# Patient Record
Sex: FEMALE | Race: WHITE | NOT HISPANIC OR LATINO
[De-identification: names, ages, dates, MRNs, and addresses within clinical notes are randomized per-mention and may not be internally consistent; named-entity substitution may affect disease eponyms.]

---

## 2022-03-16 PROBLEM — Z00.00 ENCOUNTER FOR PREVENTIVE HEALTH EXAMINATION: Status: ACTIVE | Noted: 2022-03-16

## 2022-03-23 ENCOUNTER — APPOINTMENT (OUTPATIENT)
Dept: NEUROLOGY | Facility: CLINIC | Age: 72
End: 2022-03-23

## 2022-04-04 ENCOUNTER — NON-APPOINTMENT (OUTPATIENT)
Age: 72
End: 2022-04-04

## 2022-04-07 ENCOUNTER — APPOINTMENT (OUTPATIENT)
Dept: NEUROLOGY | Facility: CLINIC | Age: 72
End: 2022-04-07
Payer: MEDICARE

## 2022-04-07 PROCEDURE — 93886 INTRACRANIAL COMPLETE STUDY: CPT

## 2022-04-07 PROCEDURE — 93880 EXTRACRANIAL BILAT STUDY: CPT

## 2022-04-07 PROCEDURE — 93892 TCD EMBOLI DETECT W/O INJ: CPT

## 2022-04-11 ENCOUNTER — APPOINTMENT (OUTPATIENT)
Dept: NEUROLOGY | Facility: CLINIC | Age: 72
End: 2022-04-11
Payer: MEDICARE

## 2022-04-11 VITALS
SYSTOLIC BLOOD PRESSURE: 146 MMHG | WEIGHT: 202 LBS | BODY MASS INDEX: 38.14 KG/M2 | HEIGHT: 61 IN | HEART RATE: 76 BPM | DIASTOLIC BLOOD PRESSURE: 82 MMHG

## 2022-04-11 DIAGNOSIS — Z82.49 FAMILY HISTORY OF ISCHEMIC HEART DISEASE AND OTHER DISEASES OF THE CIRCULATORY SYSTEM: ICD-10-CM
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
## 2022-04-11 DIAGNOSIS — Z83.3 FAMILY HISTORY OF DIABETES MELLITUS: ICD-10-CM

## 2022-04-11 DIAGNOSIS — Z78.9 OTHER SPECIFIED HEALTH STATUS: ICD-10-CM

## 2022-04-11 PROCEDURE — 99205 OFFICE O/P NEW HI 60 MIN: CPT

## 2022-04-11 RX ORDER — ASPIRIN 81 MG/1
81 TABLET, COATED ORAL DAILY
Refills: 0 | Status: ACTIVE | COMMUNITY

## 2022-04-11 NOTE — DISCUSSION/SUMMARY
[FreeTextEntry1] : Impression: Multiple chronic silent infarcts and a fall with possible loss of consciousness in March 2022. Possible etiologies of loss of consciousness could be acute stroke, seizure, or cardiac arrhythmia. \par \par Her visual fields are impaired and her gait is slowed, but she is otherwise neurologically intact. I have recommended she have an MRI/MRA to assess for recent infarction and stenoses. \par \par I have requested an ambulatory EEG to assess for epileptiform activity. \par \par I have provided her with a referral to cardiology to assess for cardiac origin of syncopal event.\par \par Due to impaired visual fields and occipital infarcts, I have recommended she have evaluation with neuro-ophthalmology. Referral provided. \par \par She endorsed snoring at night, raising concern for obstructive sleep apnea. I have requested a home sleep study. \par \par Carotid an transcranial dopplers on 4/7/22 were unremarkable.  I will defer to Dr. Fajardo as to whether the thyroid nodules incidentally detected on carotid doppler, warrant further workup and management\par \par She can follow up in 1-2 months (following above workup). I hope she remains free of serious trouble.

## 2022-04-11 NOTE — REVIEW OF SYSTEMS
[As Noted in HPI] : as noted in HPI [Negative] : Heme/Lymph [Fever] : no fever [Chills] : no chills [FreeTextEntry2] : adalid [de-identified] : remote hx of panic attacks

## 2022-04-11 NOTE — PHYSICAL EXAM
[General Appearance - Alert] : alert [General Appearance - In No Acute Distress] : in no acute distress [Oriented To Time, Place, And Person] : oriented to person, place, and time [Impaired Insight] : insight and judgment were intact [Sclera] : the sclera and conjunctiva were normal [PERRL With Normal Accommodation] : pupils were equal in size, round, reactive to light, with normal accommodation [Extraocular Movements] : extraocular movements were intact [Outer Ear] : the ears and nose were normal in appearance [Neck Appearance] : the appearance of the neck was normal [Neck Cervical Mass (___cm)] : no neck mass was observed [Jugular Venous Distention Increased] : there was no jugular-venous distention [Exaggerated Use Of Accessory Muscles For Inspiration] : no accessory muscle use [Heart Sounds] : normal S1 and S2 [No CVA Tenderness] : no ~M costovertebral angle tenderness [No Spinal Tenderness] : no spinal tenderness [Nail Clubbing] : no clubbing  or cyanosis of the fingernails [Motor Tone] : muscle strength and tone were normal [Skin Color & Pigmentation] : normal skin color and pigmentation [] : no rash [FreeTextEntry1] : Neurologic examination:\par \par Mental status:\par \par The patient is alert, attentive, and oriented. Speech is clear and fluent with good comprehension. Speech is slowed.\par \par Cranial nerves:\par \par CN II: Visual fields are were impaired, impaired on right lower quadrant. Pupils are 4 mm and briskly reactive to light. bilaterally.\par \par CN III, IV, VI: At primary gaze, there is no eye deviation.EOMI. No ptosis\par \par CN V: Facial sensation is intact bilaterally.\par \par CN VII: Face is symmetric with normal eye closure and smile.\par \par CN VII: Hearing is grossly intact.\par \par CN IX, X: Palate elevates symmetrically. Phonation is normal.\par \par CN XI: Head turning and shoulder shrug are intact\par \par CN XII: Tongue is midline with normal movements and no atrophy.\par \par Motor:\par \par There is no pronator drift of out-stretched arms. Muscle bulk and tone are normal. Strength is full bilaterally. Fine finger movements are intact.\par \par Sensory:\par \par Light touch intact in fingers and toes. No extinction.\par \par Coordination:\par \par  There is no dysmetria on finger-to-nose. Romberg is absent.\par \par Gait/Stance:\par \par Wide base and slowed. Unable to walk in tandem. Romberg is absent.\par

## 2022-04-11 NOTE — HISTORY OF PRESENT ILLNESS
[FreeTextEntry1] : Jyotsna Pathak is a 71 year old left handed lady with a past medical history of multiple chronic cerebral infarcts. She presents today with her daughter and reports that on 3/12/22, she fell at home. She did not experience any prior dizziness or presyncope.Her daughter reports that when she went in to the room, she found her mother on the floor, speaking normally. The patient does not recall the fall and only remembers being in the hospital. She was hospitalized at [facility] in [place] and they reportedly "ruled out" acute stroke and thought the most likely cause of the event was a seizure. She denied any associated focal neurologic deficits.\par \par Carotid doppler 22: No evidence of significant arterial occlusive disease in the internal carotid arteries. No significant stenosis in the external carotid arteries bilaterally. Incidental finding: nodules imaged in the right lobe of the thyroid gland measurin) 1.6 x 1 cm 2) 0.6 x 0.4 cm\par TCD 22: The [place] of [place] is within normal limits\par \par CT head 3/12/22 (per report only): Old infarcts in the right occipital lobe, right frontal and parietal operculum, and left parietal lobe. No acute large vessel infarction or intracranial hemorrhage.\par \par PCP Dr. Jesus Fajardo

## 2022-04-18 ENCOUNTER — TRANSCRIPTION ENCOUNTER (OUTPATIENT)
Age: 72
End: 2022-04-18

## 2022-04-25 ENCOUNTER — NON-APPOINTMENT (OUTPATIENT)
Age: 72
End: 2022-04-25

## 2022-04-26 ENCOUNTER — NON-APPOINTMENT (OUTPATIENT)
Age: 72
End: 2022-04-26

## 2022-04-26 ENCOUNTER — APPOINTMENT (OUTPATIENT)
Dept: OPHTHALMOLOGY | Facility: CLINIC | Age: 72
End: 2022-04-26
Payer: MEDICARE

## 2022-04-26 PROCEDURE — 99204 OFFICE O/P NEW MOD 45 MIN: CPT

## 2022-04-26 PROCEDURE — 92133 CPTRZD OPH DX IMG PST SGM ON: CPT

## 2022-04-26 PROCEDURE — 92083 EXTENDED VISUAL FIELD XM: CPT

## 2022-05-01 ENCOUNTER — OUTPATIENT (OUTPATIENT)
Dept: OUTPATIENT SERVICES | Facility: HOSPITAL | Age: 72
LOS: 1 days | End: 2022-05-01
Payer: MEDICARE

## 2022-05-01 ENCOUNTER — APPOINTMENT (OUTPATIENT)
Dept: MRI IMAGING | Facility: CLINIC | Age: 72
End: 2022-05-01
Payer: MEDICARE
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
## 2022-05-01 PROCEDURE — G1004: CPT

## 2022-05-01 PROCEDURE — 70549 MR ANGIOGRAPH NECK W/O&W/DYE: CPT | Mod: 26,ME

## 2022-05-01 PROCEDURE — 70551 MRI BRAIN STEM W/O DYE: CPT | Mod: ME

## 2022-05-01 PROCEDURE — 70544 MR ANGIOGRAPHY HEAD W/O DYE: CPT | Mod: 26,ME,59

## 2022-05-01 PROCEDURE — A9585: CPT

## 2022-05-01 PROCEDURE — 70551 MRI BRAIN STEM W/O DYE: CPT | Mod: 26,ME

## 2022-05-01 PROCEDURE — 70544 MR ANGIOGRAPHY HEAD W/O DYE: CPT | Mod: ME

## 2022-05-01 PROCEDURE — 70549 MR ANGIOGRAPH NECK W/O&W/DYE: CPT | Mod: ME

## 2022-05-12 ENCOUNTER — APPOINTMENT (OUTPATIENT)
Dept: NEUROLOGY | Facility: CLINIC | Age: 72
End: 2022-05-12
Payer: MEDICARE

## 2022-05-12 VITALS
WEIGHT: 202 LBS | HEART RATE: 74 BPM | DIASTOLIC BLOOD PRESSURE: 82 MMHG | BODY MASS INDEX: 38.14 KG/M2 | RESPIRATION RATE: 15 BRPM | SYSTOLIC BLOOD PRESSURE: 138 MMHG | HEIGHT: 61 IN

## 2022-05-12 DIAGNOSIS — Z86.73 PERSONAL HISTORY OF TRANSIENT ISCHEMIC ATTACK (TIA), AND CEREBRAL INFARCTION W/OUT RESIDUAL DEFICITS: ICD-10-CM

## 2022-05-12 DIAGNOSIS — E78.5 HYPERLIPIDEMIA, UNSPECIFIED: ICD-10-CM

## 2022-05-12 PROCEDURE — 99214 OFFICE O/P EST MOD 30 MIN: CPT

## 2022-05-12 RX ORDER — ATORVASTATIN CALCIUM 40 MG/1
40 TABLET, FILM COATED ORAL
Qty: 30 | Refills: 3 | Status: ACTIVE | COMMUNITY
Start: 2022-05-12 | End: 1900-01-01

## 2022-05-12 NOTE — DISCUSSION/SUMMARY
[FreeTextEntry1] : Impression: Multiple chronic silent infarcts and a fall with possible loss of consciousness in March 2022. Possible etiologies of loss of consciousness could be acute stroke, seizure, or cardiac arrhythmia. \par \par Her visual fields are impaired and her gait is slowed, but she is otherwise neurologically intact. I have recommended she have an MRI/MRA to assess for recent infarction and stenoses. \par \par I have requested an ambulatory EEG to assess for epileptiform activity. \par \par I have provided her with a referral to cardiology to assess for cardiac origin of syncopal event.\par \par Due to impaired visual fields and occipital infarcts, I have recommended she have evaluation with neuro-ophthalmology. Referral provided. \par \par She endorsed snoring at night, raising concern for obstructive sleep apnea. I have requested a home sleep study. \par \par Carotid an transcranial dopplers on 4/7/22 were unremarkable.  I will defer to Dr. Fajardo as to whether the thyroid nodules incidentally detected on carotid doppler, warrant further workup and management\par \par 5/12/22\par Overall she is neurologically stable. MRI/MRA from 5/1/22 revealed no acute intracranial hemorrhage or acute infarct. She has multifocal stenosis bilateral mid and distal posterior cerebral arteries with attenuation of distal right posterior cerebral artery.\par \par To assess for origin of possible syncopal event, she is scheduled to see cardiology on 5/12/22 and ambulatory EEG on 6/7/22.\par \par She should continue to benefit from aggressive vascular risk factor control.\par \par She again endorsed snoring at night, raising concern for obstructive sleep apnea. I have again requested a home sleep study. \par \par She can follow up on a yearly basis with carotid and transcranial dopplers, or sooner as needed. I hope she remains free of serious trouble.

## 2022-05-12 NOTE — HISTORY OF PRESENT ILLNESS
[FreeTextEntry1] : Jyotsna Pathak is a 71 year old left handed lady with a past medical history of multiple chronic cerebral infarcts. She presents today with her daughter and reports that on 3/12/22, she fell at home. She did not experience any prior dizziness or presyncope.Her daughter reports that when she went in to the room, she found her mother on the floor, speaking normally. The patient does not recall the fall and only remembers being in the hospital. She was hospitalized at [facility] in [place] and they reportedly "ruled out" acute stroke and thought the most likely cause of the event was a seizure. She denied any associated focal neurologic deficits.\par \par Carotid doppler 22: No evidence of significant arterial occlusive disease in the internal carotid arteries. No significant stenosis in the external carotid arteries bilaterally. Incidental finding: nodules imaged in the right lobe of the thyroid gland measurin) 1.6 x 1 cm 2) 0.6 x 0.4 cm\par TCD 22: The [place] of [place] is within normal limits\par \par CT head 3/12/22 (per report only): Old infarcts in the right occipital lobe, right frontal and parietal operculum, and left parietal lobe. No acute large vessel infarction or intracranial hemorrhage.\par \par 22\par She presents today with her daughter. She reports feeling well and denies any interval stroke or TIA symptoms. \par \par MRI/MRA 22:\par No acute intracranial hemorrhage or acute infarct.\par Multifocal stenosis bilateral mid and distal posterior cerebral arteries with attenuation of distal right posterior cerebral artery.\par Narrowing at origin of right vertebral artery.\par \par \par PCP Dr. Jesus Fajardo

## 2022-05-12 NOTE — REVIEW OF SYSTEMS
[As Noted in HPI] : as noted in HPI [Negative] : Heme/Lymph [Fever] : no fever [Chills] : no chills [FreeTextEntry2] : adalid [de-identified] : remote hx of panic attacks

## 2022-06-07 ENCOUNTER — APPOINTMENT (OUTPATIENT)
Dept: NEUROLOGY | Facility: CLINIC | Age: 72
End: 2022-06-07
Payer: MEDICARE

## 2022-06-07 PROCEDURE — 95816 EEG AWAKE AND DROWSY: CPT

## 2022-06-08 PROCEDURE — 95705 EEG W/O VID 2-12 HR UNMNTR: CPT

## 2022-06-08 PROCEDURE — 95717 EEG PHYS/QHP 2-12 HR W/O VID: CPT

## 2022-06-09 PROCEDURE — 95708 EEG WO VID EA 12-26HR UNMNTR: CPT

## 2022-06-09 PROCEDURE — 95700 EEG CONT REC W/VID EEG TECH: CPT

## 2022-06-09 PROCEDURE — 95719 EEG PHYS/QHP EA INCR W/O VID: CPT

## 2022-11-25 ENCOUNTER — RX RENEWAL (OUTPATIENT)
Age: 72
End: 2022-11-25

## 2022-12-21 ENCOUNTER — RX RENEWAL (OUTPATIENT)
Age: 72
End: 2022-12-21

## 2023-01-23 ENCOUNTER — RX RENEWAL (OUTPATIENT)
Age: 73
End: 2023-01-23

## 2023-02-19 ENCOUNTER — RX RENEWAL (OUTPATIENT)
Age: 73
End: 2023-02-19

## 2023-03-16 ENCOUNTER — RX RENEWAL (OUTPATIENT)
Age: 73
End: 2023-03-16

## 2023-04-13 ENCOUNTER — RX RENEWAL (OUTPATIENT)
Age: 73
End: 2023-04-13

## 2023-05-12 ENCOUNTER — APPOINTMENT (OUTPATIENT)
Dept: NEUROLOGY | Facility: CLINIC | Age: 73
End: 2023-05-12

## 2023-05-15 ENCOUNTER — RX RENEWAL (OUTPATIENT)
Age: 73
End: 2023-05-15

## 2023-06-09 ENCOUNTER — RX RENEWAL (OUTPATIENT)
Age: 73
End: 2023-06-09

## 2023-06-22 ENCOUNTER — APPOINTMENT (OUTPATIENT)
Dept: NEUROLOGY | Facility: CLINIC | Age: 73
End: 2023-06-22
Payer: MEDICARE

## 2023-06-22 PROCEDURE — 93880 EXTRACRANIAL BILAT STUDY: CPT

## 2023-06-22 PROCEDURE — 93886 INTRACRANIAL COMPLETE STUDY: CPT

## 2023-06-22 PROCEDURE — 93892 TCD EMBOLI DETECT W/O INJ: CPT

## 2023-06-23 ENCOUNTER — APPOINTMENT (OUTPATIENT)
Dept: NEUROLOGY | Facility: CLINIC | Age: 73
End: 2023-06-23
Payer: MEDICARE

## 2023-06-23 VITALS — SYSTOLIC BLOOD PRESSURE: 135 MMHG | DIASTOLIC BLOOD PRESSURE: 78 MMHG | HEART RATE: 77 BPM

## 2023-06-23 VITALS — WEIGHT: 202 LBS | HEIGHT: 61 IN | BODY MASS INDEX: 38.14 KG/M2

## 2023-06-23 PROCEDURE — 99214 OFFICE O/P EST MOD 30 MIN: CPT

## 2023-06-23 NOTE — HISTORY OF PRESENT ILLNESS
[FreeTextEntry1] : She is a 72 year old left handed lady.\par \par 22\par She has a past medical history of multiple chronic cerebral infarcts. She presents today with her daughter and reports that on 3/12/22, she fell at home. She did not experience any prior dizziness or presyncope.Her daughter reports that when she went in to the room, she found her mother on the floor, speaking normally. The patient does not recall the fall and only remembers being in the hospital. She was hospitalized at [facility] in [place] and they reportedly "ruled out" acute stroke and thought the most likely cause of the event was a seizure. She denied any associated focal neurologic deficits.\par \par Carotid Doppler 22: No evidence of significant arterial occlusive disease in the internal carotid arteries. No significant stenosis in the external carotid arteries bilaterally. Incidental finding: nodules imaged in the right lobe of the thyroid gland measurin) 1.6 x 1 cm 2) 0.6 x 0.4 cm\par TCD 22: The [place] of [place] is within normal limits\par \par CT head 3/12/22 (per report only): Old infarcts in the right occipital lobe, right frontal and parietal operculum, and left parietal lobe. No acute large vessel infarction or intracranial hemorrhage.\par \par 22\par She presents today with her daughter. She reports feeling well and denies any interval stroke or TIA symptoms. \par \par MRI/MRA 22:\par No acute intracranial hemorrhage or acute infarct.\par Multifocal stenosis bilateral mid and distal posterior cerebral arteries with attenuation of distal right posterior cerebral artery.\par Narrowing at origin of right vertebral artery.\par \par 23\par She presents today with her daughter. Her  passed away 6 months ago.  Over the past 6 months, her daughter notes that her mother has been less interested in helping around the house and that her reaction time has been slower.\par \par Carotid Doppler 23: Mild <40% right ICA stenosis. Left ICA is tortuous but within normal limits. No significant stenosis in the external carotid arteries bilaterally. Incidental finding: nodules imaged in the right lobe of the thyroid gland measurin) 1.8 x 1.1 cm 2) 0.6 x 0.4 cm\par TCD 23: The [place] of [place] is within normal limits\par \par PCP Dr. Jesus Ponce

## 2023-06-23 NOTE — REVIEW OF SYSTEMS
[As Noted in HPI] : as noted in HPI [Negative] : Heme/Lymph [Fever] : no fever [Chills] : no chills [FreeTextEntry2] : adalid [de-identified] : remote hx of panic attacks

## 2023-06-23 NOTE — DISCUSSION/SUMMARY
[FreeTextEntry1] : Impression: Multiple chronic silent infarcts and a fall with possible loss of consciousness in March 2022. Possible etiologies of loss of consciousness could be acute stroke, seizure, or cardiac arrhythmia. \par \par Her visual fields are impaired and her gait is slowed, but she is otherwise neurologically intact. I have recommended she have an MRI/MRA to assess for recent infarction and stenoses. \par \par I have requested an ambulatory EEG to assess for epileptiform activity. \par \par I have provided her with a referral to cardiology to assess for cardiac origin of syncopal event.\par \par Due to impaired visual fields and occipital infarcts, I have recommended she have evaluation with neuro-ophthalmology. Referral provided. \par \par She endorsed snoring at night, raising concern for obstructive sleep apnea. I have requested a home sleep study. \par \par Carotid an transcranial dopplers on 4/7/22 were unremarkable.  I will defer to Dr. Fajardo as to whether the thyroid nodules incidentally detected on carotid doppler, warrant further workup and management\par \par 5/12/22\par Overall she is neurologically stable. MRI/MRA from 5/1/22 revealed no acute intracranial hemorrhage or acute infarct. She has multifocal stenosis bilateral mid and distal posterior cerebral arteries with attenuation of distal right posterior cerebral artery.\par \par To assess for origin of possible syncopal event, she is scheduled to see cardiology on 5/12/22 and ambulatory EEG on 6/7/22.\par \par She should continue to benefit from aggressive vascular risk factor control.\par \par She again endorsed snoring at night, raising concern for obstructive sleep apnea. I have again requested a home sleep study. \par \par \par 6/23/23\par - Overall she is neurologically stable.\par - Her daughter noticed that over the last 6 months, following the passing of the patient's , she has been less interested in doing household chores and has had a delayed reaction time. I suspect that this is due to depression and have recommended she see a mental health provider. I have also requested a repeat MRI brain to screen for any new cerebral pathology.\par - Her EEG from last year did not demonstrate seizure activity but did reveal right temporal sharp waves. I have recommended she consult with one of our epilepsy providers for an opinion regarding if she should be taking an AED.\par - Dopplers done today were unremarkable.\par \par She can follow up in 2 months, after her MRI. I hope she remains free of serious trouble.

## 2023-06-23 NOTE — PHYSICAL EXAM
[General Appearance - Alert] : alert [General Appearance - In No Acute Distress] : in no acute distress [Oriented To Time, Place, And Person] : oriented to person, place, and time [Impaired Insight] : insight and judgment were intact [Sclera] : the sclera and conjunctiva were normal [PERRL With Normal Accommodation] : pupils were equal in size, round, reactive to light, with normal accommodation [Extraocular Movements] : extraocular movements were intact [Outer Ear] : the ears and nose were normal in appearance [Neck Appearance] : the appearance of the neck was normal [Neck Cervical Mass (___cm)] : no neck mass was observed [Jugular Venous Distention Increased] : there was no jugular-venous distention [Exaggerated Use Of Accessory Muscles For Inspiration] : no accessory muscle use [Heart Sounds] : normal S1 and S2 [No CVA Tenderness] : no ~M costovertebral angle tenderness [No Spinal Tenderness] : no spinal tenderness [Nail Clubbing] : no clubbing  or cyanosis of the fingernails [Motor Tone] : muscle strength and tone were normal [Skin Color & Pigmentation] : normal skin color and pigmentation [] : no rash [FreeTextEntry1] : Neurologic examination:\par \par Mental status:\par \par The patient is alert, attentive, and oriented. Speech is clear and fluent with good comprehension. Speech is slowed.\par \par Cranial nerves:\par \par CN II: Visual fields are were impaired, impaired on right lower quadrant. \par \par CN III, IV, VI: At primary gaze, there is no eye deviation.EOMI. No ptosis\par \par CN V: Facial sensation is intact bilaterally.\par \par CN VII: Face is symmetric with normal eye closure and smile.\par \par CN VII: Hearing is grossly intact.\par \par CN IX, X: Palate elevates symmetrically. Phonation is normal.\par \par CN XI: Head turning and shoulder shrug are intact\par \par CN XII: Tongue is midline with normal movements and no atrophy.\par \par Motor:\par \par There is no pronator drift of out-stretched arms. Muscle bulk and tone are normal. Strength is full bilaterally. Fine finger movements are intact.\par \par Sensory:\par \par Light touch intact in fingers and toes. No extinction.\par \par Coordination:\par \par  There is no dysmetria on finger-to-nose. Romberg is absent.\par \par Gait/Stance:\par \par Wide base and slowed. Unable to walk in tandem. Romberg is absent.\par

## 2023-07-06 ENCOUNTER — RX RENEWAL (OUTPATIENT)
Age: 73
End: 2023-07-06

## 2023-07-30 ENCOUNTER — RX RENEWAL (OUTPATIENT)
Age: 73
End: 2023-07-30

## 2023-08-01 ENCOUNTER — APPOINTMENT (OUTPATIENT)
Dept: NEUROLOGY | Facility: CLINIC | Age: 73
End: 2023-08-01

## 2023-08-03 ENCOUNTER — OUTPATIENT (OUTPATIENT)
Dept: OUTPATIENT SERVICES | Facility: HOSPITAL | Age: 73
LOS: 1 days | End: 2023-08-03
Payer: MEDICARE

## 2023-08-03 ENCOUNTER — APPOINTMENT (OUTPATIENT)
Dept: MRI IMAGING | Facility: CLINIC | Age: 73
End: 2023-08-03
Payer: MEDICARE

## 2023-08-03 DIAGNOSIS — I63.9 CEREBRAL INFARCTION, UNSPECIFIED: ICD-10-CM

## 2023-08-03 PROCEDURE — 70551 MRI BRAIN STEM W/O DYE: CPT

## 2023-08-03 PROCEDURE — 70551 MRI BRAIN STEM W/O DYE: CPT | Mod: 26,MH

## 2023-08-04 ENCOUNTER — NON-APPOINTMENT (OUTPATIENT)
Age: 73
End: 2023-08-04

## 2023-08-18 ENCOUNTER — RX RENEWAL (OUTPATIENT)
Age: 73
End: 2023-08-18

## 2023-09-05 ENCOUNTER — APPOINTMENT (OUTPATIENT)
Dept: NEUROLOGY | Facility: CLINIC | Age: 73
End: 2023-09-05
Payer: MEDICARE

## 2023-09-05 VITALS
HEART RATE: 81 BPM | WEIGHT: 206 LBS | HEIGHT: 61 IN | BODY MASS INDEX: 38.89 KG/M2 | DIASTOLIC BLOOD PRESSURE: 80 MMHG | SYSTOLIC BLOOD PRESSURE: 143 MMHG

## 2023-09-05 PROCEDURE — 99214 OFFICE O/P EST MOD 30 MIN: CPT

## 2023-09-06 NOTE — HISTORY OF PRESENT ILLNESS
[FreeTextEntry1] : *** 2023 ***  MONICA PIMENTEL is here for initial visit for probable seizure. her extended vascular history is as below: Her EEG showed RT sharps. no other clinical seizues since last year. possible some memory decline.   She is a 72 year old left handed lady.  22 She has a past medical history of multiple chronic cerebral infarcts. She presents today with her daughter and reports that on 3/12/22, she fell at home. She did not experience any prior dizziness or presyncope.Her daughter reports that when she went in to the room, she found her mother on the floor, speaking normally. The patient does not recall the fall and only remembers being in the hospital. She was hospitalized at [facility] in [place] and they reportedly "ruled out" acute stroke and thought the most likely cause of the event was a seizure. She denied any associated focal neurologic deficits.  Carotid Doppler 22: No evidence of significant arterial occlusive disease in the internal carotid arteries. No significant stenosis in the external carotid arteries bilaterally. Incidental finding: nodules imaged in the right lobe of the thyroid gland measurin) 1.6 x 1 cm 2) 0.6 x 0.4 cm TCD 22: The [place] of [place] is within normal limits  CT head 3/12/22 (per report only): Old infarcts in the right occipital lobe, right frontal and parietal operculum, and left parietal lobe. No acute large vessel infarction or intracranial hemorrhage.  22 She presents today with her daughter. She reports feeling well and denies any interval stroke or TIA symptoms.   MRI/MRA 22: No acute intracranial hemorrhage or acute infarct. Multifocal stenosis bilateral mid and distal posterior cerebral arteries with attenuation of distal right posterior cerebral artery. Narrowing at origin of right vertebral artery.  23 She presents today with her daughter. Her  passed away 6 months ago.  Over the past 6 months, her daughter notes that her mother has been less interested in helping around the house and that her reaction time has been slower.  Carotid Doppler 23: Mild <40% right ICA stenosis. Left ICA is tortuous but within normal limits. No significant stenosis in the external carotid arteries bilaterally. Incidental finding: nodules imaged in the right lobe of the thyroid gland measurin) 1.8 x 1.1 cm 2) 0.6 x 0.4 cm TCD 23: The [place] of [place] is within normal limits  PCP Dr. Jesus Ponce

## 2023-09-06 NOTE — ASSESSMENT
[FreeTextEntry1] : MONICA PIMENTEL is a 73 yo F  with one clinical convulsive seizure and additional risk for seizures. She will qualify for AEDs. Plan: start xcopri 12.5-25 with a target of 100 daily. Naizylam rescue f/u in 4 mo

## 2023-09-06 NOTE — REASON FOR VISIT
[Initial Eval - Existing Diagnosis] : an initial evaluation of an existing diagnosis [Follow-Up: _____] : a [unfilled] follow-up visit [Family Member] : family member

## 2023-09-06 NOTE — PHYSICAL EXAM
[FreeTextEntry1] : Neurologic examination:\par  \par  Mental status:\par  \par  The patient is alert, attentive, and oriented. Speech is clear and fluent with good comprehension. Speech is slowed.\par  \par  Cranial nerves:\par  \par  CN II: Visual fields are were impaired, impaired on right lower quadrant. \par  \par  CN III, IV, VI: At primary gaze, there is no eye deviation.EOMI. No ptosis\par  \par  CN V: Facial sensation is intact bilaterally.\par  \par  CN VII: Face is symmetric with normal eye closure and smile.\par  \par  CN VII: Hearing is grossly intact.\par  \par  CN IX, X: Palate elevates symmetrically. Phonation is normal.\par  \par  CN XI: Head turning and shoulder shrug are intact\par  \par  CN XII: Tongue is midline with normal movements and no atrophy.\par  \par  Motor:\par  \par  There is no pronator drift of out-stretched arms. Muscle bulk and tone are normal. Strength is full bilaterally. Fine finger movements are intact.\par  \par  Sensory:\par  \par  Light touch intact in fingers and toes. No extinction.\par  \par  Coordination:\par  \par   There is no dysmetria on finger-to-nose. Romberg is absent.\par  \par  Gait/Stance:\par  \par  Wide base and slowed. Unable to walk in tandem. Romberg is absent.\par   [General Appearance - Alert] : alert [General Appearance - In No Acute Distress] : in no acute distress [Oriented To Time, Place, And Person] : oriented to person, place, and time [Impaired Insight] : insight and judgment were intact [Sclera] : the sclera and conjunctiva were normal [PERRL With Normal Accommodation] : pupils were equal in size, round, reactive to light, with normal accommodation [Extraocular Movements] : extraocular movements were intact [Outer Ear] : the ears and nose were normal in appearance [Neck Appearance] : the appearance of the neck was normal [Neck Cervical Mass (___cm)] : no neck mass was observed [Jugular Venous Distention Increased] : there was no jugular-venous distention [Exaggerated Use Of Accessory Muscles For Inspiration] : no accessory muscle use [Heart Sounds] : normal S1 and S2 [No CVA Tenderness] : no ~M costovertebral angle tenderness [No Spinal Tenderness] : no spinal tenderness [Nail Clubbing] : no clubbing  or cyanosis of the fingernails [Motor Tone] : muscle strength and tone were normal [Skin Color & Pigmentation] : normal skin color and pigmentation [] : no rash

## 2023-09-06 NOTE — REVIEW OF SYSTEMS
[Fever] : no fever [Chills] : no chills [As Noted in HPI] : as noted in HPI [Negative] : Heme/Lymph [FreeTextEntry2] : adalid [de-identified] : remote hx of panic attacks

## 2023-09-14 ENCOUNTER — RX RENEWAL (OUTPATIENT)
Age: 73
End: 2023-09-14

## 2023-09-14 ENCOUNTER — NON-APPOINTMENT (OUTPATIENT)
Age: 73
End: 2023-09-14

## 2023-09-28 ENCOUNTER — APPOINTMENT (OUTPATIENT)
Dept: NEUROLOGY | Facility: CLINIC | Age: 73
End: 2023-09-28

## 2023-10-24 ENCOUNTER — APPOINTMENT (OUTPATIENT)
Dept: NEUROLOGY | Facility: CLINIC | Age: 73
End: 2023-10-24

## 2023-10-24 RX ORDER — CENOBAMATE 50MG-100MG
14 X 50 MG & KIT ORAL
Qty: 1 | Refills: 0 | Status: COMPLETED | COMMUNITY
Start: 2023-09-05 | End: 2023-10-24

## 2023-10-24 RX ORDER — MIDAZOLAM 5 MG/.1ML
5 SPRAY NASAL
Qty: 1 | Refills: 0 | Status: ACTIVE | COMMUNITY
Start: 2023-09-05 | End: 1900-01-01

## 2023-10-26 ENCOUNTER — RX RENEWAL (OUTPATIENT)
Age: 73
End: 2023-10-26

## 2023-11-14 ENCOUNTER — APPOINTMENT (OUTPATIENT)
Dept: NEUROLOGY | Facility: CLINIC | Age: 73
End: 2023-11-14

## 2023-11-23 ENCOUNTER — RX RENEWAL (OUTPATIENT)
Age: 73
End: 2023-11-23

## 2023-12-05 ENCOUNTER — APPOINTMENT (OUTPATIENT)
Dept: NEUROLOGY | Facility: CLINIC | Age: 73
End: 2023-12-05

## 2023-12-08 ENCOUNTER — APPOINTMENT (OUTPATIENT)
Dept: NEUROLOGY | Facility: CLINIC | Age: 73
End: 2023-12-08
Payer: MEDICARE

## 2023-12-08 VITALS
BODY MASS INDEX: 38.89 KG/M2 | SYSTOLIC BLOOD PRESSURE: 165 MMHG | DIASTOLIC BLOOD PRESSURE: 84 MMHG | WEIGHT: 206 LBS | HEART RATE: 66 BPM | HEIGHT: 61 IN

## 2023-12-08 PROCEDURE — 99214 OFFICE O/P EST MOD 30 MIN: CPT

## 2023-12-21 ENCOUNTER — RX RENEWAL (OUTPATIENT)
Age: 73
End: 2023-12-21

## 2024-01-05 ENCOUNTER — APPOINTMENT (OUTPATIENT)
Dept: NEUROLOGY | Facility: CLINIC | Age: 74
End: 2024-01-05

## 2024-01-29 ENCOUNTER — RX RENEWAL (OUTPATIENT)
Age: 74
End: 2024-01-29

## 2024-02-08 ENCOUNTER — APPOINTMENT (OUTPATIENT)
Dept: NEUROLOGY | Facility: CLINIC | Age: 74
End: 2024-02-08

## 2024-02-27 ENCOUNTER — APPOINTMENT (OUTPATIENT)
Dept: NEUROLOGY | Facility: CLINIC | Age: 74
End: 2024-02-27
Payer: MEDICARE

## 2024-02-27 VITALS — BODY MASS INDEX: 38.89 KG/M2 | HEIGHT: 61 IN | WEIGHT: 206 LBS

## 2024-02-27 PROCEDURE — 99214 OFFICE O/P EST MOD 30 MIN: CPT

## 2024-03-04 ENCOUNTER — RX RENEWAL (OUTPATIENT)
Age: 74
End: 2024-03-04

## 2024-03-04 NOTE — HISTORY OF PRESENT ILLNESS
[FreeTextEntry1] : *** 2024 ***  MONICA PIMENTEL is here for follow up. stable no seizures   *** 2023 ***  MONICA PIMENTEL is here for initial visit for probable seizure. her extended vascular history is as below: Her EEG showed RT sharps. no other clinical seizues since last year. possible some memory decline.   She is a 72 year old left handed lady.  22 She has a past medical history of multiple chronic cerebral infarcts. She presents today with her daughter and reports that on 3/12/22, she fell at home. She did not experience any prior dizziness or presyncope.Her daughter reports that when she went in to the room, she found her mother on the floor, speaking normally. The patient does not recall the fall and only remembers being in the hospital. She was hospitalized at [facility] in [place] and they reportedly "ruled out" acute stroke and thought the most likely cause of the event was a seizure. She denied any associated focal neurologic deficits.  Carotid Doppler 22: No evidence of significant arterial occlusive disease in the internal carotid arteries. No significant stenosis in the external carotid arteries bilaterally. Incidental finding: nodules imaged in the right lobe of the thyroid gland measurin) 1.6 x 1 cm 2) 0.6 x 0.4 cm TCD 22: The [place] is within normal limits  CT head 3/12/22 (per report only): Old infarcts in the right occipital lobe, right frontal and parietal operculum, and left parietal lobe. No acute large vessel infarction or intracranial hemorrhage.  22 She presents today with her daughter. She reports feeling well and denies any interval stroke or TIA symptoms.   MRI/MRA 22: No acute intracranial hemorrhage or acute infarct. Multifocal stenosis bilateral mid and distal posterior cerebral arteries with attenuation of distal right posterior cerebral artery. Narrowing at origin of right vertebral artery.  23 She presents today with her daughter. Her  passed away 6 months ago.  Over the past 6 months, her daughter notes that her mother has been less interested in helping around the house and that her reaction time has been slower.  Carotid Doppler 23: Mild <40% right ICA stenosis. Left ICA is tortuous but within normal limits. No significant stenosis in the external carotid arteries bilaterally. Incidental finding: nodules imaged in the right lobe of the thyroid gland measurin) 1.8 x 1.1 cm 2) 0.6 x 0.4 cm TCD 23: The [place] is within normal limits  PCP Dr. Jesus Ponce

## 2024-03-04 NOTE — ASSESSMENT
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  Plan: continue  xcopri 100 daily f/u in 3 months

## 2024-03-04 NOTE — REVIEW OF SYSTEMS
[As Noted in HPI] : as noted in HPI [Negative] : Heme/Lymph [Fever] : no fever [Chills] : no chills [de-identified] : remote hx of panic attacks [FreeTextEntry2] : adalid

## 2024-03-04 NOTE — REASON FOR VISIT
[Follow-Up: _____] : a [unfilled] follow-up visit [Family Member] : family member [Initial Eval - Existing Diagnosis] : an initial evaluation of an existing diagnosis

## 2024-04-03 ENCOUNTER — RESULT REVIEW (OUTPATIENT)
Age: 74
End: 2024-04-03

## 2024-04-03 ENCOUNTER — OUTPATIENT (OUTPATIENT)
Dept: OUTPATIENT SERVICES | Facility: HOSPITAL | Age: 74
LOS: 1 days | End: 2024-04-03
Payer: MEDICARE

## 2024-04-03 ENCOUNTER — APPOINTMENT (OUTPATIENT)
Dept: NUCLEAR MEDICINE | Facility: IMAGING CENTER | Age: 74
End: 2024-04-03
Payer: MEDICARE

## 2024-04-03 DIAGNOSIS — G40.109 LOCALIZATION-RELATED (FOCAL) (PARTIAL) SYMPTOMATIC EPILEPSY AND EPILEPTIC SYNDROMES WITH SIMPLE PARTIAL SEIZURES, NOT INTRACTABLE, WITHOUT STATUS EPILEPTICUS: ICD-10-CM

## 2024-04-03 PROCEDURE — 78608 BRAIN IMAGING (PET): CPT

## 2024-04-03 PROCEDURE — 78999 UNLISTED MISC PX DX NUC MED: CPT | Mod: 26,MH

## 2024-04-03 PROCEDURE — 78608 BRAIN IMAGING (PET): CPT | Mod: 26,MH

## 2024-04-03 PROCEDURE — 78999 UNLISTED MISC PX DX NUC MED: CPT

## 2024-04-03 PROCEDURE — A9552: CPT

## 2024-04-08 ENCOUNTER — TRANSCRIPTION ENCOUNTER (OUTPATIENT)
Age: 74
End: 2024-04-08

## 2024-04-20 ENCOUNTER — NON-APPOINTMENT (OUTPATIENT)
Age: 74
End: 2024-04-20

## 2024-05-01 RX ORDER — CENOBAMATE 100 MG/1
100 TABLET, FILM COATED ORAL
Qty: 30 | Refills: 5 | Status: ACTIVE | COMMUNITY
Start: 2023-10-24 | End: 1900-01-01

## 2024-05-03 ENCOUNTER — APPOINTMENT (OUTPATIENT)
Dept: NEUROLOGY | Facility: CLINIC | Age: 74
End: 2024-05-03

## 2024-05-27 ENCOUNTER — RX RENEWAL (OUTPATIENT)
Age: 74
End: 2024-05-27

## 2024-06-11 ENCOUNTER — APPOINTMENT (OUTPATIENT)
Dept: NEUROLOGY | Facility: CLINIC | Age: 74
End: 2024-06-11
Payer: MEDICARE

## 2024-06-11 ENCOUNTER — APPOINTMENT (OUTPATIENT)
Dept: NEUROLOGY | Facility: CLINIC | Age: 74
End: 2024-06-11

## 2024-06-11 VITALS
HEART RATE: 63 BPM | HEIGHT: 61 IN | DIASTOLIC BLOOD PRESSURE: 87 MMHG | WEIGHT: 200 LBS | SYSTOLIC BLOOD PRESSURE: 151 MMHG | BODY MASS INDEX: 37.76 KG/M2

## 2024-06-11 DIAGNOSIS — G40.109 LOCALIZATION-RELATED (FOCAL) (PARTIAL) SYMPTOMATIC EPILEPSY AND EPILEPTIC SYNDROMES WITH SIMPLE PARTIAL SEIZURES, NOT INTRACTABLE, W/OUT STATUS EPILEPTICUS: ICD-10-CM

## 2024-06-11 PROCEDURE — 99214 OFFICE O/P EST MOD 30 MIN: CPT

## 2024-06-11 PROCEDURE — 93886 INTRACRANIAL COMPLETE STUDY: CPT

## 2024-06-11 PROCEDURE — 93892 TCD EMBOLI DETECT W/O INJ: CPT

## 2024-06-11 PROCEDURE — 93880 EXTRACRANIAL BILAT STUDY: CPT

## 2024-06-17 ENCOUNTER — APPOINTMENT (OUTPATIENT)
Dept: NEUROLOGY | Facility: CLINIC | Age: 74
End: 2024-06-17
Payer: MEDICARE

## 2024-06-17 DIAGNOSIS — I63.9 CEREBRAL INFARCTION, UNSPECIFIED: ICD-10-CM

## 2024-06-17 PROBLEM — G40.109 TEMPORAL LOBE SEIZURE: Status: ACTIVE | Noted: 2023-09-05

## 2024-06-17 PROCEDURE — 99213 OFFICE O/P EST LOW 20 MIN: CPT

## 2024-06-17 PROCEDURE — G2211 COMPLEX E/M VISIT ADD ON: CPT

## 2024-06-17 RX ORDER — LACTOBACILLUS ACIDOPHILUS/PECT 30 MG-20MG
TABLET ORAL
Refills: 0 | Status: ACTIVE | COMMUNITY

## 2024-06-17 RX ORDER — ATORVASTATIN CALCIUM 40 MG/1
40 TABLET, FILM COATED ORAL
Qty: 90 | Refills: 2 | Status: ACTIVE | COMMUNITY
Start: 2022-11-25 | End: 1900-01-01

## 2024-06-17 RX ORDER — CALCIUM CARBONATE/VITAMIN D3 600 MG-10
TABLET ORAL
Refills: 0 | Status: ACTIVE | COMMUNITY

## 2024-06-17 NOTE — PHYSICAL EXAM
[General Appearance - Alert] : alert [General Appearance - In No Acute Distress] : in no acute distress [Oriented To Time, Place, And Person] : oriented to person, place, and time [Impaired Insight] : insight and judgment were intact [Sclera] : the sclera and conjunctiva were normal [PERRL With Normal Accommodation] : pupils were equal in size, round, reactive to light, with normal accommodation [Extraocular Movements] : extraocular movements were intact [Outer Ear] : the ears and nose were normal in appearance [Neck Appearance] : the appearance of the neck was normal [Neck Cervical Mass (___cm)] : no neck mass was observed [Jugular Venous Distention Increased] : there was no jugular-venous distention [Exaggerated Use Of Accessory Muscles For Inspiration] : no accessory muscle use [Heart Sounds] : normal S1 and S2 [No CVA Tenderness] : no ~M costovertebral angle tenderness [No Spinal Tenderness] : no spinal tenderness [Nail Clubbing] : no clubbing  or cyanosis of the fingernails [Motor Tone] : muscle strength and tone were normal [Skin Color & Pigmentation] : normal skin color and pigmentation [] : no rash [FreeTextEntry1] : This exam reflects the previous neurologic exam, in addition to all components of the exam which can be conducted by video, such as the NIH stroke scale, and other exam elements which do not require physical contact between provider and patient.  Neurologic examination:  Mental status:  The patient is alert, attentive, and oriented. Speech is clear and fluent with good comprehension. Speech is slowed.  Cranial nerves:  CN II: Visual fields are were impaired, impaired on right lower quadrant.   CN III, IV, VI: At primary gaze, there is no eye deviation.EOMI. No ptosis  CN V: Facial sensation is intact bilaterally.  CN VII: Face is symmetric with normal eye closure and smile.  CN VII: Hearing is grossly intact.  CN IX, X: Palate elevates symmetrically. Phonation is normal.  CN XI: Head turning and shoulder shrug are intact  CN XII: Tongue is midline with normal movements and no atrophy.  Motor:  There is no pronator drift of out-stretched arms. Muscle bulk and tone are normal. Strength is full bilaterally. Fine finger movements are intact.  Sensory:  Light touch intact in fingers and toes. No extinction.  Coordination:   There is no dysmetria on finger-to-nose. Romberg is absent.  Gait/Stance:  Wide base and slowed. Unable to walk in tandem. Romberg is absent.

## 2024-06-17 NOTE — HISTORY OF PRESENT ILLNESS
[Home] : at home, [unfilled] , at the time of the visit. [Medical Office: ([facility])___] : at the medical office located in  [Verbal consent obtained from patient] : the patient, [unfilled] [FreeTextEntry1] : She is a 73 year old left handed lady.  22 She has a past medical history of multiple chronic cerebral infarcts. She presents today with her daughter and reports that on 3/12/22, she fell at home. She did not experience any prior dizziness or presyncope.Her daughter reports that when she went in to the room, she found her mother on the floor, speaking normally. The patient does not recall the fall and only remembers being in the hospital. She was hospitalized at [facility] in [place] and they reportedly "ruled out" acute stroke and thought the most likely cause of the event was a seizure. She denied any associated focal neurologic deficits.  Carotid Doppler 22: No evidence of significant arterial occlusive disease in the internal carotid arteries. No significant stenosis in the external carotid arteries bilaterally. Incidental finding: nodules imaged in the right lobe of the thyroid gland measurin) 1.6 x 1 cm 2) 0.6 x 0.4 cm TCD 22: The [place] of [place] is within normal limits  CT head 3/12/22 (per report only): Old infarcts in the right occipital lobe, right frontal and parietal operculum, and left parietal lobe. No acute large vessel infarction or intracranial hemorrhage.  22 She presents today with her daughter. She reports feeling well and denies any interval stroke or TIA symptoms.   MRI/MRA 22: No acute intracranial hemorrhage or acute infarct. Multifocal stenosis bilateral mid and distal posterior cerebral arteries with attenuation of distal right posterior cerebral artery. Narrowing at origin of right vertebral artery.  23 She presents today with her daughter. Her  passed away 6 months ago.  Over the past 6 months, her daughter notes that her mother has been less interested in helping around the house and that her reaction time has been slower.  Carotid Doppler 23: Mild <40% right ICA stenosis. Left ICA is tortuous but within normal limits. No significant stenosis in the external carotid arteries bilaterally. Incidental finding: nodules imaged in the right lobe of the thyroid gland measurin) 1.8 x 1.1 cm 2) 0.6 x 0.4 cm TCD 23: The [place] of [place] is within normal limits  24 She presents today with her daughter. She denies any new focal neurologic symptoms.  Carotid Doppler 24:  Bilateral mild < 40% stenosis in the bulb of the carotid arteries.  artery.The ICAs are tortuous bilaterally INCIDENTAL FINDING: Nodules imaged in right lobe of thyroid gland. [place]: 1)1.9 x 1.4 cm. 2) 0.6 x 0.5 cm. RETROSPECTIVE COMPARISON: There has been a mild increase in the degree of stenosis in the bulb of the left carotid artery and an additional right thyroid nodule detected on the current exam as compared to the previous exam. . TCD 24: Normal Study right side of [place]. No sign of stenosis in the anterior and posterior circulations. Ophthalmic arteries and intracranial siphons are within normal limits. Vertebral arteries and basilar artery are within normal limits. Unable to insonate the left MCA,SOPHIE,PCA due to a poor temporal acoustic window. Highly technically difficult study. No significant change vs prior study  except unable to insonate left temporal window on the current exam.   PCP Dr. Jesus Ponce

## 2024-06-17 NOTE — REVIEW OF SYSTEMS
[Fever] : no fever [Chills] : no chills [As Noted in HPI] : as noted in HPI [Negative] : Heme/Lymph [FreeTextEntry2] : adalid [de-identified] : remote hx of panic attacks

## 2024-06-17 NOTE — REVIEW OF SYSTEMS
[As Noted in HPI] : as noted in HPI [Negative] : Heme/Lymph [Fever] : no fever [Chills] : no chills [FreeTextEntry2] : adalid [de-identified] : remote hx of panic attacks

## 2024-06-17 NOTE — DISCUSSION/SUMMARY
[FreeTextEntry1] : 4/11/22 Impression: Multiple chronic silent infarcts and a fall with possible loss of consciousness in March 2022. Possible etiologies of loss of consciousness could be acute stroke, seizure, or cardiac arrhythmia.  - Her visual fields are impaired and her gait is slowed, but she is otherwise neurologically intact. I have recommended she have an MRI/MRA to assess for recent infarction and stenoses.  - I have requested an ambulatory EEG to assess for epileptiform activity.  - I have provided her with a referral to cardiology to assess for cardiac origin of syncopal event. - Due to impaired visual fields and occipital infarcts, I have recommended she have evaluation with neuro-ophthalmology. Referral provided.  - She endorsed snoring at night, raising concern for obstructive sleep apnea. I have requested a home sleep study.  - Carotid an transcranial dopplers on 4/7/22 were unremarkable.  I will defer to Dr. Fajardo as to whether the thyroid nodules incidentally detected on carotid doppler, warrant further workup and management  5/12/22 - Overall she is neurologically stable. MRI/MRA from 5/1/22 revealed no acute intracranial hemorrhage or acute infarct. She has multifocal stenosis bilateral mid and distal posterior cerebral arteries with attenuation of distal right posterior cerebral artery. - To assess for origin of possible syncopal event, she is scheduled to see cardiology on 5/12/22 and ambulatory EEG on 6/7/22. - She should continue to benefit from aggressive vascular risk factor control. - She again endorsed snoring at night, raising concern for obstructive sleep apnea. I have again requested a home sleep study.   6/23/23 - Overall she is neurologically stable. - Her daughter noticed that over the last 6 months, following the passing of the patient's , she has been less interested in doing household chores and has had a delayed reaction time. I suspect that this is due to depression and have recommended she see a mental health provider. I have also requested a repeat MRI brain to screen for any new cerebral pathology. - Her EEG from last year did not demonstrate seizure activity but did reveal right temporal sharp waves. I have recommended she consult with one of our epilepsy providers for an opinion regarding if she should be taking an AED. - Dopplers done today were unremarkable.  8/4/23 chart note MRI Brain 8/2/2023: No new acute infarct. There are multiple chronic infarcts in the right frontal lobe, left posterior medial parietal lobe, right occipital and temporal lobes, left occipital lobe, left basal ganglia, and right cerebellum, as seen on prior exam. Moderate periventricular and subcortical white matter chronic microvascular ischemic changes.  Impression: Chronic infarcts. Moderate periventricular and subcortical white matter hyperintensities of presumed vascular origin. She should benefit from aggressive vascular risk factor control; target LDL <70. No new infarct/pathology to account for her apathy regarding house chores. I suspect this is more likely due to depression following the death of her , and recommend she see a mental health provider.  9/14/23 chart note Collateral information: As part of her syncope workup in 2022, she had 2 weeks of cardiac monitoring which was negative. She was incidentally found to have multiple chronic infarcts (clinically silent) at the time, with a mechanism consistent with embolic stroke of undetermined source. She also has intracranial atherosclerosis of the PCAs. She is taking Aspirin 81mg and should benefit from aggressive vascular risk factor control. Target LDL <70. She was recently seen by Dr. Sen, and it was felt that her episode of loss of consciousness was due to seizure activity and she was started on Xcopri.  I spoke to her daughter and recommended further cardiac monitoring, at least 2 more weeks, but preferably an ILR to screen for occult AF.  6/17/24 - Overall she is neurologically stable and doing well. - Dopplers on 6/11/24 were unremarkable, with mild ICA stenosis bilaterally. There was an additional thyroid nodule, in addition to others previously documented. I will defer to Dr. Ponce   regarding whether this finding warrants further investigation.  - She should continue to benefit from aggressive vascular risk factor control. At her request, I renewed her Atorvastatin. She will continue to monitor her LFTs and LDL with Dr. Ponce. - She continues to experience a sense of apathy regarding house chores. She doesn't feel a sense of apathy with anything else. Her MRI in 08/2023 did not demonstrate new pathology to account for this apathy; I suspected this may have been due to depression. She doesn't feel that she is depressed and does not want to see a mental health provider at this time.  She can follow up in 1 year with repeat Dopplers. I hope she remains free of serious trouble.

## 2024-06-17 NOTE — HISTORY OF PRESENT ILLNESS
[FreeTextEntry1] : *** 2024 ***  MONICA PIMENTEL is here for follow up. stable no seizures   *** 2023 ***  MONICA PIMENTEL is here for initial visit for probable seizure. her extended vascular history is as below: Her EEG showed RT sharps. no other clinical seizues since last year. possible some memory decline.   She is a 72 year old left handed lady.  22 She has a past medical history of multiple chronic cerebral infarcts. She presents today with her daughter and reports that on 3/12/22, she fell at home. She did not experience any prior dizziness or presyncope.Her daughter reports that when she went in to the room, she found her mother on the floor, speaking normally. The patient does not recall the fall and only remembers being in the hospital. She was hospitalized at [facility] in [place] and they reportedly "ruled out" acute stroke and thought the most likely cause of the event was a seizure. She denied any associated focal neurologic deficits.  Carotid Doppler 22: No evidence of significant arterial occlusive disease in the internal carotid arteries. No significant stenosis in the external carotid arteries bilaterally. Incidental finding: nodules imaged in the right lobe of the thyroid gland measurin) 1.6 x 1 cm 2) 0.6 x 0.4 cm TCD 22: The [place] of [place] is within normal limits  CT head 3/12/22 (per report only): Old infarcts in the right occipital lobe, right frontal and parietal operculum, and left parietal lobe. No acute large vessel infarction or intracranial hemorrhage.  22 She presents today with her daughter. She reports feeling well and denies any interval stroke or TIA symptoms.   MRI/MRA 22: No acute intracranial hemorrhage or acute infarct. Multifocal stenosis bilateral mid and distal posterior cerebral arteries with attenuation of distal right posterior cerebral artery. Narrowing at origin of right vertebral artery.  23 She presents today with her daughter. Her  passed away 6 months ago.  Over the past 6 months, her daughter notes that her mother has been less interested in helping around the house and that her reaction time has been slower.  Carotid Doppler 23: Mild <40% right ICA stenosis. Left ICA is tortuous but within normal limits. No significant stenosis in the external carotid arteries bilaterally. Incidental finding: nodules imaged in the right lobe of the thyroid gland measurin) 1.8 x 1.1 cm 2) 0.6 x 0.4 cm TCD 23: The [place] of [place] is within normal limits  PCP Dr. Jesus Ponce

## 2024-07-09 ENCOUNTER — APPOINTMENT (OUTPATIENT)
Dept: NEUROLOGY | Facility: CLINIC | Age: 74
End: 2024-07-09

## 2024-09-17 ENCOUNTER — APPOINTMENT (OUTPATIENT)
Dept: NEUROLOGY | Facility: CLINIC | Age: 74
End: 2024-09-17

## 2024-09-17 PROCEDURE — 99214 OFFICE O/P EST MOD 30 MIN: CPT

## 2025-01-14 ENCOUNTER — APPOINTMENT (OUTPATIENT)
Dept: INTERNAL MEDICINE | Facility: CLINIC | Age: 75
End: 2025-01-14

## 2025-01-14 ENCOUNTER — OUTPATIENT (OUTPATIENT)
Dept: OUTPATIENT SERVICES | Facility: HOSPITAL | Age: 75
LOS: 1 days | End: 2025-01-14

## 2025-03-17 ENCOUNTER — RX RENEWAL (OUTPATIENT)
Age: 75
End: 2025-03-17

## 2025-03-17 ENCOUNTER — APPOINTMENT (OUTPATIENT)
Dept: NEUROLOGY | Facility: CLINIC | Age: 75
End: 2025-03-17

## 2025-03-28 ENCOUNTER — APPOINTMENT (OUTPATIENT)
Dept: NEUROLOGY | Facility: CLINIC | Age: 75
End: 2025-03-28

## 2025-03-28 VITALS
WEIGHT: 204 LBS | SYSTOLIC BLOOD PRESSURE: 159 MMHG | HEART RATE: 75 BPM | DIASTOLIC BLOOD PRESSURE: 84 MMHG | HEIGHT: 61 IN | BODY MASS INDEX: 38.51 KG/M2

## 2025-03-28 PROCEDURE — 99214 OFFICE O/P EST MOD 30 MIN: CPT

## 2025-05-30 ENCOUNTER — APPOINTMENT (OUTPATIENT)
Dept: NEUROLOGY | Facility: CLINIC | Age: 75
End: 2025-05-30
Payer: MEDICARE

## 2025-05-30 ENCOUNTER — NON-APPOINTMENT (OUTPATIENT)
Age: 75
End: 2025-05-30

## 2025-05-30 VITALS
HEART RATE: 78 BPM | HEIGHT: 61 IN | DIASTOLIC BLOOD PRESSURE: 85 MMHG | BODY MASS INDEX: 37.57 KG/M2 | SYSTOLIC BLOOD PRESSURE: 134 MMHG | WEIGHT: 199 LBS

## 2025-05-30 DIAGNOSIS — G40.109 LOCALIZATION-RELATED (FOCAL) (PARTIAL) SYMPTOMATIC EPILEPSY AND EPILEPTIC SYNDROMES WITH SIMPLE PARTIAL SEIZURES, NOT INTRACTABLE, W/OUT STATUS EPILEPTICUS: ICD-10-CM

## 2025-05-30 DIAGNOSIS — I63.9 CEREBRAL INFARCTION, UNSPECIFIED: ICD-10-CM

## 2025-05-30 PROCEDURE — 99214 OFFICE O/P EST MOD 30 MIN: CPT

## 2025-06-05 ENCOUNTER — APPOINTMENT (OUTPATIENT)
Dept: NEUROLOGY | Facility: CLINIC | Age: 75
End: 2025-06-05

## 2025-06-18 ENCOUNTER — APPOINTMENT (OUTPATIENT)
Dept: NEUROLOGY | Facility: CLINIC | Age: 75
End: 2025-06-18
Payer: MEDICARE

## 2025-06-18 VITALS
HEART RATE: 84 BPM | WEIGHT: 199 LBS | SYSTOLIC BLOOD PRESSURE: 135 MMHG | OXYGEN SATURATION: 99 % | BODY MASS INDEX: 37.57 KG/M2 | RESPIRATION RATE: 20 BRPM | HEIGHT: 61 IN | DIASTOLIC BLOOD PRESSURE: 81 MMHG

## 2025-06-18 PROCEDURE — G2211 COMPLEX E/M VISIT ADD ON: CPT

## 2025-06-18 PROCEDURE — 99213 OFFICE O/P EST LOW 20 MIN: CPT

## 2025-07-02 ENCOUNTER — APPOINTMENT (OUTPATIENT)
Dept: NEUROLOGY | Facility: CLINIC | Age: 75
End: 2025-07-02
Payer: MEDICARE

## 2025-07-02 VITALS
HEART RATE: 70 BPM | BODY MASS INDEX: 37.57 KG/M2 | SYSTOLIC BLOOD PRESSURE: 132 MMHG | RESPIRATION RATE: 21 BRPM | OXYGEN SATURATION: 98 % | DIASTOLIC BLOOD PRESSURE: 84 MMHG | HEIGHT: 61 IN | WEIGHT: 199 LBS

## 2025-07-02 PROBLEM — H53.462 HEMIANOPIA, HOMONYMOUS, LEFT: Status: ACTIVE | Noted: 2025-07-02

## 2025-07-02 PROBLEM — H53.461 HEMIANOPIA, HOMONYMOUS, RIGHT: Status: ACTIVE | Noted: 2025-07-02

## 2025-07-02 PROCEDURE — G2211 COMPLEX E/M VISIT ADD ON: CPT

## 2025-07-02 PROCEDURE — 99215 OFFICE O/P EST HI 40 MIN: CPT

## 2025-07-08 ENCOUNTER — OUTPATIENT (OUTPATIENT)
Dept: OUTPATIENT SERVICES | Facility: HOSPITAL | Age: 75
LOS: 1 days | End: 2025-07-08

## 2025-07-08 ENCOUNTER — APPOINTMENT (OUTPATIENT)
Dept: INTERNAL MEDICINE | Facility: CLINIC | Age: 75
End: 2025-07-08

## 2025-07-17 ENCOUNTER — APPOINTMENT (OUTPATIENT)
Dept: MRI IMAGING | Facility: CLINIC | Age: 75
End: 2025-07-17
Payer: MEDICARE

## 2025-07-17 ENCOUNTER — OUTPATIENT (OUTPATIENT)
Dept: OUTPATIENT SERVICES | Facility: HOSPITAL | Age: 75
LOS: 1 days | End: 2025-07-17
Payer: MEDICARE

## 2025-07-17 DIAGNOSIS — Z00.00 ENCOUNTER FOR GENERAL ADULT MEDICAL EXAMINATION WITHOUT ABNORMAL FINDINGS: ICD-10-CM

## 2025-07-17 PROCEDURE — 70544 MR ANGIOGRAPHY HEAD W/O DYE: CPT | Mod: 26,XU

## 2025-07-17 PROCEDURE — 70551 MRI BRAIN STEM W/O DYE: CPT | Mod: 26

## 2025-07-17 PROCEDURE — 70549 MR ANGIOGRAPH NECK W/O&W/DYE: CPT | Mod: 26

## 2025-07-17 PROCEDURE — A9585: CPT

## 2025-07-17 PROCEDURE — 70551 MRI BRAIN STEM W/O DYE: CPT

## 2025-07-17 PROCEDURE — 70544 MR ANGIOGRAPHY HEAD W/O DYE: CPT

## 2025-07-17 PROCEDURE — 70549 MR ANGIOGRAPH NECK W/O&W/DYE: CPT

## 2025-08-07 ENCOUNTER — NON-APPOINTMENT (OUTPATIENT)
Age: 75
End: 2025-08-07

## 2025-08-07 ENCOUNTER — APPOINTMENT (OUTPATIENT)
Dept: ELECTROPHYSIOLOGY | Facility: CLINIC | Age: 75
End: 2025-08-07
Payer: MEDICARE

## 2025-08-07 VITALS
TEMPERATURE: 98 F | WEIGHT: 199 LBS | RESPIRATION RATE: 16 BRPM | OXYGEN SATURATION: 98 % | SYSTOLIC BLOOD PRESSURE: 140 MMHG | DIASTOLIC BLOOD PRESSURE: 84 MMHG | BODY MASS INDEX: 37.6 KG/M2 | HEART RATE: 76 BPM

## 2025-08-07 DIAGNOSIS — I63.9 CEREBRAL INFARCTION, UNSPECIFIED: ICD-10-CM

## 2025-08-07 DIAGNOSIS — E78.5 HYPERLIPIDEMIA, UNSPECIFIED: ICD-10-CM

## 2025-08-07 DIAGNOSIS — G40.109 LOCALIZATION-RELATED (FOCAL) (PARTIAL) SYMPTOMATIC EPILEPSY AND EPILEPTIC SYNDROMES WITH SIMPLE PARTIAL SEIZURES, NOT INTRACTABLE, W/OUT STATUS EPILEPTICUS: ICD-10-CM

## 2025-08-07 PROCEDURE — 99204 OFFICE O/P NEW MOD 45 MIN: CPT

## 2025-08-07 PROCEDURE — G2211 COMPLEX E/M VISIT ADD ON: CPT

## 2025-08-07 PROCEDURE — 93000 ELECTROCARDIOGRAM COMPLETE: CPT

## 2025-08-13 ENCOUNTER — APPOINTMENT (OUTPATIENT)
Dept: NEUROLOGY | Facility: CLINIC | Age: 75
End: 2025-08-13
Payer: MEDICARE

## 2025-08-13 PROCEDURE — 95816 EEG AWAKE AND DROWSY: CPT

## 2025-08-14 PROCEDURE — 95708 EEG WO VID EA 12-26HR UNMNTR: CPT

## 2025-08-14 PROCEDURE — 95719 EEG PHYS/QHP EA INCR W/O VID: CPT

## 2025-08-14 PROCEDURE — 95700 EEG CONT REC W/VID EEG TECH: CPT

## 2025-08-26 ENCOUNTER — TRANSCRIPTION ENCOUNTER (OUTPATIENT)
Age: 75
End: 2025-08-26

## 2025-08-26 ENCOUNTER — OUTPATIENT (OUTPATIENT)
Dept: OUTPATIENT SERVICES | Facility: HOSPITAL | Age: 75
LOS: 1 days | End: 2025-08-26
Payer: MEDICARE

## 2025-08-26 VITALS
WEIGHT: 199.08 LBS | DIASTOLIC BLOOD PRESSURE: 87 MMHG | OXYGEN SATURATION: 97 % | SYSTOLIC BLOOD PRESSURE: 146 MMHG | RESPIRATION RATE: 12 BRPM | HEIGHT: 61 IN | HEART RATE: 76 BPM | TEMPERATURE: 98 F

## 2025-08-26 VITALS
OXYGEN SATURATION: 99 % | RESPIRATION RATE: 16 BRPM | SYSTOLIC BLOOD PRESSURE: 152 MMHG | HEART RATE: 70 BPM | DIASTOLIC BLOOD PRESSURE: 79 MMHG

## 2025-08-26 DIAGNOSIS — Z98.891 HISTORY OF UTERINE SCAR FROM PREVIOUS SURGERY: Chronic | ICD-10-CM

## 2025-08-26 DIAGNOSIS — I63.9 CEREBRAL INFARCTION, UNSPECIFIED: ICD-10-CM

## 2025-08-26 PROCEDURE — 33285 INSJ SUBQ CAR RHYTHM MNTR: CPT

## 2025-08-26 RX ORDER — CENOBAMATE 150-200 MG
1 KIT ORAL
Refills: 0 | DISCHARGE

## 2025-08-26 RX ORDER — ASPIRIN 325 MG
2 TABLET ORAL
Refills: 0 | DISCHARGE

## 2025-08-26 RX ORDER — ATORVASTATIN CALCIUM 80 MG/1
1 TABLET, FILM COATED ORAL
Refills: 0 | DISCHARGE

## 2025-09-04 PROBLEM — G40.909 EPILEPSY, UNSPECIFIED, NOT INTRACTABLE, WITHOUT STATUS EPILEPTICUS: Chronic | Status: ACTIVE | Noted: 2025-08-26

## 2025-09-04 PROBLEM — I63.9 CEREBRAL INFARCTION, UNSPECIFIED: Chronic | Status: ACTIVE | Noted: 2025-08-26

## 2025-09-06 ENCOUNTER — RESULT REVIEW (OUTPATIENT)
Age: 75
End: 2025-09-06

## 2025-09-08 ENCOUNTER — TRANSCRIPTION ENCOUNTER (OUTPATIENT)
Age: 75
End: 2025-09-08

## 2025-09-09 ENCOUNTER — APPOINTMENT (OUTPATIENT)
Dept: ELECTROPHYSIOLOGY | Facility: CLINIC | Age: 75
End: 2025-09-09
Payer: MEDICARE

## 2025-09-09 DIAGNOSIS — I26.99 OTHER PULMONARY EMBOLISM W/OUT ACUTE COR PULMONALE: ICD-10-CM

## 2025-09-09 PROCEDURE — 99212 OFFICE O/P EST SF 10 MIN: CPT | Mod: 2W

## 2025-09-09 RX ORDER — APIXABAN 5 MG/1
5 TABLET, FILM COATED ORAL
Qty: 60 | Refills: 5 | Status: ACTIVE | COMMUNITY
Start: 2025-09-09

## 2025-09-12 PROCEDURE — G0452: CPT | Mod: 26
